# Patient Record
Sex: FEMALE | URBAN - METROPOLITAN AREA
[De-identification: names, ages, dates, MRNs, and addresses within clinical notes are randomized per-mention and may not be internally consistent; named-entity substitution may affect disease eponyms.]

---

## 2017-11-15 ENCOUNTER — OFFICE VISIT - HEALTHEAST (OUTPATIENT)
Dept: FAMILY MEDICINE | Facility: CLINIC | Age: 42
End: 2017-11-15

## 2017-11-15 DIAGNOSIS — L30.9 ECZEMA: ICD-10-CM

## 2017-11-15 DIAGNOSIS — R22.41 FOOT MASS, RIGHT: ICD-10-CM

## 2017-11-15 ASSESSMENT — MIFFLIN-ST. JEOR: SCORE: 1333.54

## 2017-12-04 ENCOUNTER — OFFICE VISIT - HEALTHEAST (OUTPATIENT)
Dept: PODIATRY | Facility: CLINIC | Age: 42
End: 2017-12-04

## 2017-12-04 DIAGNOSIS — M77.41 METATARSALGIA OF RIGHT FOOT: ICD-10-CM

## 2021-04-20 ENCOUNTER — AMBULATORY - HEALTHEAST (OUTPATIENT)
Dept: NURSING | Facility: CLINIC | Age: 46
End: 2021-04-20

## 2021-05-11 ENCOUNTER — AMBULATORY - HEALTHEAST (OUTPATIENT)
Dept: NURSING | Facility: CLINIC | Age: 46
End: 2021-05-11

## 2021-05-31 VITALS — WEIGHT: 147 LBS | HEIGHT: 66 IN | BODY MASS INDEX: 23.63 KG/M2

## 2021-06-14 NOTE — PROGRESS NOTES
Admission History & Physical  Tiarra Mabry, 1975, [id number]    [facility]  Dianelys Flowers MD, [phone]    Extended Emergency Contact Information  Primary Emergency Contact: Junaid Angel  Address: [address] [place], [place] [address] [facility], [address] [place]  Home Phone: [phone]  Mobile Phone: [phone]  Relation: Spouse     Assessment and Plan:   Assessment: Metatarsalgia right foot  Plan: The patient is to monitor her condition and return to the clinic if her symptoms recur  Active Problems:    * No active hospital problems. *      Chief Complaint:  Painful right foot     HPI:    Tiarra Mabry is a 42 y.o. old female who presented to the clinic today complaining of mild pain on the bottom of her right foot.  She stated that the pain is in the ball of the right foot.  She noticed that several days ago she had a soft lump and some mild discoloration on the ball of the right foot.  This area of her foot was quite painful.  She did admit that her symptoms have significantly improved.  She started to cancel her appointment but decided to keep the appointment to have her foot evaluated.  She does not recall any trauma to the foot.  She does not exercise regularly.  History is provided by patient    Medical History  Active Ambulatory (Non-Hospital) Problems    Diagnosis     Irregular Length Of Menstrual Periods     Female Infertility     Dyspepsia     Severe Cervical Dysplasia (EDA III)     Eczema     History reviewed. No pertinent past medical history.  Patient Active Problem List    Diagnosis Date Noted     Irregular Length Of Menstrual Periods      Female Infertility      Dyspepsia      Severe Cervical Dysplasia (EDA III)      Eczema      Surgical History  She  has no past surgical history on file.   History reviewed. No pertinent surgical history. Social History  Reviewed, and she  reports that she has never smoked. She has never used smokeless tobacco. She reports that  she drinks alcohol. She reports that she does not use illicit drugs.  Social History   Substance Use Topics     Smoking status: Never Smoker     Smokeless tobacco: Never Used     Alcohol use Yes      Comment: 1-2 Drinks per week      Allergies  No Known Allergies Family History  Reviewed, and family history is not on file.   Psychosocial Needs  Social History     Social History Narrative     Additional psychosocial needs reviewed per nursing assessment.       Prior to Admission Medications     (Not in a hospital admission)        Review of Systems - Negative     Pulse 88  Resp 16  SpO2 97%    Objective findings: General: The patient is alert and in no acute distress      Integument: Nails bilateral feet are normal length and color.    Skin bilaterally warm and dry.        Vascular: DP and PT pulses +2/4 bilateral feet.  Capillary refill less than 2 seconds bilateral feet.      Neurologic: Negative clonus, negative Babinski bilaterally.       Musculoskeletal: Range of motion within normal limits bilaterally.  Muscle power +5/5 in all compartments bilaterally.  There is no pain on palpation of the planta aspect of the right foot.  No palpable masses noted.  There is pain to range of motion second metatarsophalangeal joint right foot.      Assessment: Metatarsalgia      Plan: I informed the patient that it appears that she has improved and should continue to improve without further complication.  I have asked the patient to return to the clinic if her symptoms recur.

## 2021-06-14 NOTE — PROGRESS NOTES
Assessment/Plan:    Diagnoses and all orders for this visit:    Foot mass, right - I suspect a ganglion cyst given that the lump seems deep and is painful to palpation.  A deep callous or wart would also be a possibility.  I have recommended ibuprofen 400 mg twice daily for 10 days, and then as needed.  I am also recommending further evaluation by podiatry.  -     Ambulatory referral to Podiatry    Eczema - Continue gentle cleanser and lotion use.  I am also recommending over the counter cortisone cream as needed.  If not improving, we would step up to a slightly stronger steroid cream.    Patient Instructions   1) Ibuprofen 400 mg twice daily for 10 days, then as needed.  2) You will receive a call to schedule with podiatry.  3) Over the counter cortisone cream (hydrocortisone) to dry patches on face twice daily as needed.  Please let me know if this is not effective and we could do a slightly stronger cream.  4) Wash face daily with gentle cleanser and then apply lotion twice daily.       Subjective:   Tiarra Mabry is a 42 y.o. female who presents today complaining of a lump on her right foot.  She just noticed it yesterday.  She reports that it is painful.  No injury to the foot.  No new shoes.  No fever.  No history of similar symptoms.  She is not taking anything for the pain.      She is also complaining of dry spots on her face for about a month.  She uses some regular soap on her face with no change.  No new lotions, etc.  She has had dry skin in the past in the winter time.  She is using Ponds lotion on her face.  She has also used Neutragena.      Patient Active Problem List   Diagnosis     Dyspepsia     Severe Cervical Dysplasia (EDA III)     Eczema     Irregular Length Of Menstrual Periods     Female Infertility        History reviewed. No pertinent past medical history.     History reviewed. No pertinent surgical history.     No current outpatient prescriptions on file.      Review of Systems  See HPI    "  Objective:     Vitals:    11/15/17 1343   BP: 120/66   Patient Site: Left Arm   Patient Position: Sitting   Cuff Size: Adult Regular   Pulse: 75   Resp: 16   Temp: 98.1  F (36.7  C)   TempSrc: Oral   SpO2: 97%   Weight: 147 lb (66.7 kg)   Height: 5' 6\" (1.676 m)        Physical Exam   Constitutional: She is oriented to person, place, and time. She appears well-developed and well-nourished. No distress.   HENT:   Right Ear: Tympanic membrane and ear canal normal.   Left Ear: Tympanic membrane and ear canal normal.   Mouth/Throat: Oropharynx is clear and moist.   Eyes: Pupils are equal, round, and reactive to light.   Neck: Normal range of motion. Neck supple.   Cardiovascular: Normal rate and regular rhythm.    No murmur heard.  Pulmonary/Chest: Effort normal and breath sounds normal. No respiratory distress. She has no wheezes. She has no rales.   Musculoskeletal:   Right foot: 1 cm lump on the dorsum of her right foot proximal to her 2nd toe.  It is tender to palpation and seems to be deep rather than a superficial lesion.  There is a small verrucous lesion that is nearby but the painful area does not appear to be a wart.  No erythema or warmth.   Lymphadenopathy:     She has no cervical adenopathy.   Neurological: She is alert and oriented to person, place, and time. No cranial nerve deficit.   Skin:   Dry patches present on both side of her nose.  No sparing of nasolabial fold.  No flaking of skin.  No rash around eyes or mouth.        "

## 2021-10-11 ENCOUNTER — HEALTH MAINTENANCE LETTER (OUTPATIENT)
Age: 46
End: 2021-10-11

## 2022-09-25 ENCOUNTER — HEALTH MAINTENANCE LETTER (OUTPATIENT)
Age: 47
End: 2022-09-25

## 2023-01-30 ENCOUNTER — HEALTH MAINTENANCE LETTER (OUTPATIENT)
Age: 48
End: 2023-01-30

## 2024-03-02 ENCOUNTER — HEALTH MAINTENANCE LETTER (OUTPATIENT)
Age: 49
End: 2024-03-02